# Patient Record
Sex: FEMALE | Race: OTHER
[De-identification: names, ages, dates, MRNs, and addresses within clinical notes are randomized per-mention and may not be internally consistent; named-entity substitution may affect disease eponyms.]

---

## 2018-11-16 ENCOUNTER — HOSPITAL ENCOUNTER (EMERGENCY)
Dept: HOSPITAL 72 - EMR | Age: 58
Discharge: HOME | End: 2018-11-16
Payer: SELF-PAY

## 2018-11-16 VITALS — BODY MASS INDEX: 28.45 KG/M2 | WEIGHT: 177 LBS | HEIGHT: 66 IN

## 2018-11-16 VITALS — SYSTOLIC BLOOD PRESSURE: 142 MMHG | DIASTOLIC BLOOD PRESSURE: 72 MMHG

## 2018-11-16 VITALS — SYSTOLIC BLOOD PRESSURE: 147 MMHG | DIASTOLIC BLOOD PRESSURE: 75 MMHG

## 2018-11-16 DIAGNOSIS — R42: Primary | ICD-10-CM

## 2018-11-16 LAB
ADD MANUAL DIFF: NO
ALBUMIN SERPL-MCNC: 4.3 G/DL (ref 3.4–5)
ALBUMIN/GLOB SERPL: 0.9 {RATIO} (ref 1–2.7)
ALP SERPL-CCNC: 79 U/L (ref 46–116)
ALT SERPL-CCNC: 34 U/L (ref 12–78)
ANION GAP SERPL CALC-SCNC: 8 MMOL/L (ref 5–15)
AST SERPL-CCNC: 21 U/L (ref 15–37)
BASOPHILS NFR BLD AUTO: 0.7 % (ref 0–2)
BILIRUB SERPL-MCNC: 0.3 MG/DL (ref 0.2–1)
BUN SERPL-MCNC: 13 MG/DL (ref 7–18)
CALCIUM SERPL-MCNC: 10.1 MG/DL (ref 8.5–10.1)
CHLORIDE SERPL-SCNC: 103 MMOL/L (ref 98–107)
CO2 SERPL-SCNC: 30 MMOL/L (ref 21–32)
CREAT SERPL-MCNC: 0.9 MG/DL (ref 0.55–1.3)
EOSINOPHIL NFR BLD AUTO: 0.4 % (ref 0–3)
ERYTHROCYTE [DISTWIDTH] IN BLOOD BY AUTOMATED COUNT: 11.1 % (ref 11.6–14.8)
GLOBULIN SER-MCNC: 5 G/DL
HCT VFR BLD CALC: 42.9 % (ref 37–47)
HGB BLD-MCNC: 14.8 G/DL (ref 12–16)
LYMPHOCYTES NFR BLD AUTO: 15.8 % (ref 20–45)
MCV RBC AUTO: 92 FL (ref 80–99)
MONOCYTES NFR BLD AUTO: 6.3 % (ref 1–10)
NEUTROPHILS NFR BLD AUTO: 76.7 % (ref 45–75)
PLATELET # BLD: 246 K/UL (ref 150–450)
POTASSIUM SERPL-SCNC: 4 MMOL/L (ref 3.5–5.1)
RBC # BLD AUTO: 4.64 M/UL (ref 4.2–5.4)
SODIUM SERPL-SCNC: 141 MMOL/L (ref 136–145)
WBC # BLD AUTO: 8.5 K/UL (ref 4.8–10.8)

## 2018-11-16 PROCEDURE — 80053 COMPREHEN METABOLIC PANEL: CPT

## 2018-11-16 PROCEDURE — 93005 ELECTROCARDIOGRAM TRACING: CPT

## 2018-11-16 PROCEDURE — 99284 EMERGENCY DEPT VISIT MOD MDM: CPT

## 2018-11-16 PROCEDURE — 85025 COMPLETE CBC W/AUTO DIFF WBC: CPT

## 2018-11-16 PROCEDURE — 36415 COLL VENOUS BLD VENIPUNCTURE: CPT

## 2018-11-16 NOTE — EMERGENCY ROOM REPORT
History of Present Illness


General


Chief Complaint:  General Complaint


Source:  Patient





Present Illness


HPI


57 YO Female presents to the ED c/o feeling shaky and having dizziness x 1 

month. Described intermittent episodic bouts of the room spinning without 

visual or auditory changes. Denies N/V. Pt. reports feeling Shaky today and has 

had a progressive onset 5/10 in severity HA that is generalized. Denies 

weakness or loss of gross motor movements, paresthesias, difficulty with 

speaking, swallowing or walking.  Denies significant Pmhx and denies hx of 

anxiety/ panic attacks. Pt. denies hx of migraines or recent head injury. 

Denies Neck pain or stiffness. Denies photophobia. Patient states that she is 

worried about the cost that she does not have insurance and would like to keep 

cost down as much as possible.


Allergies:  


Coded Allergies:  


     No Known Allergies (Unverified , 11/16/18)





Patient History


Past Medical History:  see triage record


Past Surgical History:  none


Pertinent Family History:  none


Last Menstrual Period:  menopause


Pregnant Now:  No


Reviewed Nursing Documentation:  PMH: Agreed; PSxH: Agreed





Nursing Documentation-PMH


Past Medical History:  No Stated History





Review of Systems


All Other Systems:  negative except mentioned in HPI





Physical Exam





Vital Signs








  Date Time  Temp Pulse Resp B/P (MAP) Pulse Ox O2 Delivery O2 Flow Rate FiO2


 


11/16/18 16:21 98.6 97 16 147/75 100 Room Air  








Sp02 EP Interpretation:  reviewed, normal


General Appearance:  no apparent distress, alert, GCS 15, non-toxic, mild 

distress - Pt.


Head:  normocephalic, atraumatic


Eyes:  bilateral eye normal inspection, bilateral eye PERRL


ENT:  hearing grossly normal, normal voice


Neck:  full range of motion


Respiratory:  chest non-tender, lungs clear, normal breath sounds, speaking 

full sentences


Cardiovascular #1:  regular rate, rhythm, no edema


Gastrointestinal:  normal bowel sounds, non tender, soft


Rectal:  deferred


Genitourinary:  normal inspection


Musculoskeletal:  back normal, gait/station normal, normal range of motion, non-

tender


Neurologic:  alert, oriented x3, responsive, motor strength/tone normal, 

sensory intact, normal gait, speech normal, no pronator, other - Unable to 

illicit nystagmus, able to perform finger to nose. No facial droop., grossly 

normal


Psychiatric:  judgement/insight normal, anxious


Skin:  normal color, no rash, warm/dry, well hydrated





Medical Decision Making


PA Attestation


Dr. Seymour  is my supervising Physician whom patient management has been discussed 

with.


Diagnostic Impression:  


 Primary Impression:  


 Vertigo


ER Course


57 YO Female presents to the ED c/o feeling shaky and having dizziness x 1 

month. Described intermittent episodic bouts of the room spinning without 

visual or auditory changes. Denies N/V. Pt. reports feeling Shaky today and has 

had a progressive onset 5/10 in severity HA that is generalized. Denies 

weakness or loss of gross motor movements, paresthesias, difficulty with 

speaking, swallowing or walking.  Denies significant Pmhx and denies hx of 

anxiety/ panic attacks. Pt. denies hx of migraines or recent head injury. 

Denies Neck pain or stiffness. Denies photophobia. Patient states that she is 

worried about the cost that she does not have insurance and would like to keep 

cost down as much as possible.





Ddx considered but are not limited to Mnire's, BPPV, labrinitis, cerebellar 

stroke, Central vertigo, hypovolemia, cardiac cause, Anxiety reaction.





Vital signs: are WNL, pt. is afebrile 





H&PE are most consistent with : Vertigo


No focal deficit to indicate TIA or CVA.  No Nystagmus of any direction.  

Better after Meclizine.  Because of lack of focality and red flags pt. is 

stable for close outpatient Neuro evaluation. 





ORDERS: 





-CT head no contrast- negative for ICH, EDEMA, or mass 


-CMP:  Unremarkable


- EKG: NSR 





ED INTERVENTIONS:  


Ativan PO


Meclizine PO





Pt. reports symptoms have improved. 





I discussed with this patient that it is very important that she follow-up with 

a neurologist and have further evaluation/testing performed as having recurring 

headaches and symptoms of vertigo can represent and underlying problem in the 

brain. Pt. given strict ED return precautions. Family member at bedside also 

present and demonstrates understanding.  





DISCHARGE: At this time pt. is stable for d/c to home. Will provide printed 

patient care instructions, and any necessary prescriptions. Care plan and 

follow up instructions have been discussed with the patient prior to discharge.





Labs








Test


  11/16/18


18:00


 


White Blood Count


  8.5 K/UL


(4.8-10.8)


 


Red Blood Count


  4.64 M/UL


(4.20-5.40)


 


Hemoglobin


  14.8 G/DL


(12.0-16.0)


 


Hematocrit


  42.9 %


(37.0-47.0)


 


Mean Corpuscular Volume 92 FL (80-99) 


 


Mean Corpuscular Hemoglobin


  31.9 PG


(27.0-31.0)


 


Mean Corpuscular Hemoglobin


Concent 34.5 G/DL


(32.0-36.0)


 


Red Cell Distribution Width


  11.1 %


(11.6-14.8)


 


Platelet Count


  246 K/UL


(150-450)


 


Mean Platelet Volume


  7.6 FL


(6.5-10.1)


 


Neutrophils (%) (Auto)


  76.7 %


(45.0-75.0)


 


Lymphocytes (%) (Auto)


  15.8 %


(20.0-45.0)


 


Monocytes (%) (Auto)


  6.3 %


(1.0-10.0)


 


Eosinophils (%) (Auto)


  0.4 %


(0.0-3.0)


 


Basophils (%) (Auto)


  0.7 %


(0.0-2.0)


 


Sodium Level


  141 MMOL/L


(136-145)


 


Potassium Level


  4.0 MMOL/L


(3.5-5.1)


 


Chloride Level


  103 MMOL/L


()


 


Carbon Dioxide Level


  30 MMOL/L


(21-32)


 


Anion Gap


  8 mmol/L


(5-15)


 


Blood Urea Nitrogen


  13 mg/dL


(7-18)


 


Creatinine


  0.9 MG/DL


(0.55-1.30)


 


Estimat Glomerular Filtration


Rate > 60 mL/min


(>60)


 


Glucose Level


  107 MG/DL


()


 


Calcium Level


  10.1 MG/DL


(8.5-10.1)


 


Total Bilirubin


  0.3 MG/DL


(0.2-1.0)


 


Aspartate Amino Transf


(AST/SGOT) 21 U/L (15-37) 


 


 


Alanine Aminotransferase


(ALT/SGPT) 34 U/L (12-78) 


 


 


Alkaline Phosphatase


  79 U/L


()


 


Total Protein


  9.3 G/DL


(6.4-8.2)


 


Albumin


  4.3 G/DL


(3.4-5.0)


 


Globulin 5.0 g/dL 


 


Albumin/Globulin Ratio 0.9 (1.0-2.7) 








EKG Diagnostic Results


EP Interpretation:  Dr. Seymour


Rate:  normal - 68 bpm


Rhythm:  NSR


ST Segments:  no acute changes


ASA given to the pt in ED:  No


PA Scribe Text


This Interpretation was scribed by MAXWELL Carrera.





Last Vital Signs








  Date Time  Temp Pulse Resp B/P (MAP) Pulse Ox O2 Delivery O2 Flow Rate FiO2


 


11/16/18 16:21 98.6 97 16 147/75 100 Room Air  








Status:  improved


Disposition:  HOME, SELF-CARE


Condition:  Stable


Scripts


Acetaminophen* (TYLENOL EXTRA STRENGTH*) 500 Mg Tablet


500 MG ORAL Q6H, #20 TAB 0 Refills


   Prov: Mia Carrera         11/16/18 


Meclizine Hcl* (VERTICALM*) 25 Mg Tablet


25 MG ORAL THREE TIMES A DAY, #15 TAB


   Prov: Mia Carrera         11/16/18


Departure Forms:  Return to Work      Return to Work Date:  Nov 19, 2018


         Return to Full Activity:  Nov 19, 2018


Patient Instructions:  Dizziness, Easy-to-Read, Vertigo, Easy-to-Read





Additional Instructions:  


Take medications as directed. 


 ** Follow up with a Neurologist in 3-5 days, even if your symptoms have 

resolved. ** 


--Please review list of primary care clinics, if you do not already have a 

primary care provider





Return sooner to ED if new symptoms occur, or current symptoms become worse. 


Do not drink alcohol, drive, or operate heavy machinery while taking Meclizine/ 

Antivert as this may cause drowsiness. 











- Please note that this Emergency Department Report was dictated using [employer] technology software, occasionally this can lead to 

erroneous entry secondary to interpretation by the dictation equipment.











Mia Carrera Nov 16, 2018 19:07

## 2018-11-20 NOTE — CARDIOLOGY REPORT
--------------- APPROVED REPORT --------------





EKG Measurement

Heart [id number]

MA [id number]

[id number]

[id number]

[id number]





Normal sinus rhythm

Normal ECG